# Patient Record
Sex: MALE | Race: BLACK OR AFRICAN AMERICAN | Employment: FULL TIME | URBAN - METROPOLITAN AREA
[De-identification: names, ages, dates, MRNs, and addresses within clinical notes are randomized per-mention and may not be internally consistent; named-entity substitution may affect disease eponyms.]

---

## 2017-04-03 ENCOUNTER — OFFICE VISIT (OUTPATIENT)
Dept: FAMILY MEDICINE CLINIC | Age: 27
End: 2017-04-03

## 2017-04-03 VITALS
HEART RATE: 70 BPM | OXYGEN SATURATION: 97 % | WEIGHT: 207.5 LBS | DIASTOLIC BLOOD PRESSURE: 80 MMHG | HEIGHT: 72 IN | SYSTOLIC BLOOD PRESSURE: 100 MMHG | BODY MASS INDEX: 28.1 KG/M2 | RESPIRATION RATE: 16 BRPM | TEMPERATURE: 98.3 F

## 2017-04-03 DIAGNOSIS — R06.6 HICCUPS: Primary | ICD-10-CM

## 2017-04-03 NOTE — PROGRESS NOTES
1. Have you been to the ER, urgent care clinic since your last visit? Hospitalized since your last visit? No    2. Have you seen or consulted any other health care providers outside of the [address] [place] [address] since your last visit? Include any pap smears or colon screening. No    Chief Complaint   Patient presents with    Hiccups     last week had hiccups every day all day and stopped over the weekend     Pt states he has hiccups last week every day, all day and stopper over the weekend.

## 2017-04-03 NOTE — MR AVS SNAPSHOT
Visit Information Date & Time Provider Department Dept. Phone Encounter #  
 4/3/2017  4:00 PM Princess Martino, [address] [phone] [id number] Upcoming Health Maintenance Date Due DTaP/Tdap/Td series (1 - Tdap) 5/14/2013 INFLUENZA AGE 9 TO ADULT 8/1/2016 Allergies as of 4/3/2017  Review Complete On: 4/3/2017 By: Scott Martin LPN Severity Noted Reaction Type Reactions Pcn [Penicillins]  03/01/2011    Hives Current Immunizations  Never Reviewed No immunizations on file. Not reviewed this visit You Were Diagnosed With   
  
 Codes Comments Hiccups    -  Primary ICD-10-CM: R06.6 ICD-9-CM: 302. 8 Vitals BP Pulse Temp Resp Height(growth percentile) Weight(growth percentile) 100/80 70 98.3 °F (36.8 °C) (Oral) 16 6' (1.829 m) 207 lb 8 oz (94.1 kg) SpO2 BMI Smoking Status 97% 28.14 kg/m2 Never Smoker Vitals History BMI and BSA Data Body Mass Index Body Surface Area  
 28.14 kg/m 2 2.19 m 2 Preferred Pharmacy Pharmacy Name Phone 99 [facility], [address] [place] [address] [phone] Your Updated Medication List  
  
   
This list is accurate as of: 4/3/17  4:56 PM.  Always use your most recent med list.  
  
  
  
  
 bacitracin 500 unit/gram Oint Commonly known as:  Van[address] Apply 1 Each to affected area two (2) times a day. mometasone 0.1 % topical cream  
Commonly known as:  Richard Revels Apply  to affected area two (2) times a day. As needed for skin irritation/rash Introducing [facility]! Sam Durbin introduces [employer] patient portal. Now you can access parts of your medical record, email your doctor's office, and request medication refills online. 1. In your internet browser, go to [email or web address]. [employer]. [employer] 2. Click on the First Time User? Click Here link in the Sign In box. You will see the New Member Sign Up page. 3. Enter your [employer] Access Code exactly as it appears below. You will not need to use this code after youve completed the sign-up process. If you do not sign up before the expiration date, you must request a new code. · [employer] Access Code: [email or web address] Expires: 7/2/2017  4:56 PM 
 
4. Enter the last four digits of your Social Security Number (xxxx) and Date of Birth (mm/dd/yyyy) as indicated and click Submit. You will be taken to the next sign-up page. 5. Create a [employer] ID. This will be your [employer] login ID and cannot be changed, so think of one that is secure and easy to remember. 6. Create a [employer] password. You can change your password at any time. 7. Enter your Password Reset Question and Answer. This can be used at a later time if you forget your password. 8. Enter your e-mail address. You will receive e-mail notification when new information is available in [address] 9. Click Sign Up. You can now view and download portions of your medical record. 10. Click the Download Summary menu link to download a portable copy of your medical information. If you have questions, please visit the Frequently Asked Questions section of the [employer] website. Remember, [employer] is NOT to be used for urgent needs. For medical emergencies, dial 911. Now available from your iPhone and Android! Please provide this summary of care documentation to your next provider. Your primary care clinician is listed as Emre Gonzalez. If you have any questions after today's visit, please call [phone].

## 2017-04-03 NOTE — PROGRESS NOTES
HISTORY OF PRESENT ILLNESS  Scarlet Stoddard is a 27 y.o. male. HPI  He is here for hiccups that he had on and off all of last week  Did not occur at night with sleep, only during the day  Does not have gerd or heart burn however does eat wiliam or mcdonalds every day   Does not cook for himself  No headaches, no other sx noted  Has not happened in 3 days    ROS  A comprehensive review of system was obtained and negative except findings in the HPI    Visit Vitals    /80    Pulse 70    Temp 98.3 °F (36.8 °C) (Oral)    Resp 16    Ht 6' (1.829 m)    Wt 207 lb 8 oz (94.1 kg)    SpO2 97%    BMI 28.14 kg/m2     Physical Exam   Constitutional: He is oriented to person, place, and time. He appears well-developed and well-nourished. No distress. Cardiovascular: Normal rate and regular rhythm. No murmur heard. Pulmonary/Chest: Breath sounds normal. No respiratory distress. He has no wheezes. Musculoskeletal: He exhibits no edema. Neurological: He is alert and oriented to person, place, and time. Nursing note and vitals reviewed. ASSESSMENT and PLAN  Encounter Diagnoses   Name Primary?  Hiccups Yes     No orders of the defined types were placed in this encounter. Reviewed triggers  Reviewed food choices; eating slower  Follow-up Disposition: Not on File    I have discussed the diagnosis with the patient and the intended plan as seen in the above orders. The patient has received an after-visit summary and questions were answered concerning future plans. Patient conveyed understanding of the plan at the time of the visit.     Ana Shaw, MSN, ANP  4/3/2017

## 2018-09-05 ENCOUNTER — HOSPITAL ENCOUNTER (EMERGENCY)
Age: 28
Discharge: HOME | End: 2018-09-05
Attending: EMERGENCY MEDICINE | Admitting: EMERGENCY MEDICINE
Payer: COMMERCIAL

## 2018-09-05 VITALS
BODY MASS INDEX: 29.8 KG/M2 | SYSTOLIC BLOOD PRESSURE: 125 MMHG | RESPIRATION RATE: 14 BRPM | HEIGHT: 72 IN | OXYGEN SATURATION: 97 % | WEIGHT: 220 LBS | TEMPERATURE: 98.3 F | HEART RATE: 85 BPM | DIASTOLIC BLOOD PRESSURE: 83 MMHG

## 2018-09-05 DIAGNOSIS — R11.2 NON-INTRACTABLE VOMITING WITH NAUSEA, UNSPECIFIED VOMITING TYPE: Primary | ICD-10-CM

## 2018-09-05 DIAGNOSIS — R10.84 ABDOMINAL PAIN, GENERALIZED: ICD-10-CM

## 2018-09-05 DIAGNOSIS — R19.7 DIARRHEA, UNSPECIFIED TYPE: ICD-10-CM

## 2018-09-05 LAB
ALBUMIN SERPL-MCNC: 3.8 G/DL (ref 3.5–5)
ALBUMIN/GLOB SERPL: 1 {RATIO} (ref 1.1–2.2)
ALP SERPL-CCNC: 72 U/L (ref 45–117)
ALT SERPL-CCNC: 45 U/L (ref 12–78)
ANION GAP SERPL CALC-SCNC: 9 MMOL/L (ref 5–15)
APPEARANCE UR: CLEAR
AST SERPL-CCNC: 28 U/L (ref 15–37)
BACTERIA URNS QL MICRO: NEGATIVE /HPF
BASOPHILS # BLD: 0 K/UL (ref 0–0.1)
BASOPHILS NFR BLD: 1 % (ref 0–1)
BILIRUB SERPL-MCNC: 0.8 MG/DL (ref 0.2–1)
BILIRUB UR QL: NEGATIVE
BUN SERPL-MCNC: 12 MG/DL (ref 6–20)
BUN/CREAT SERPL: 10 (ref 12–20)
CALCIUM SERPL-MCNC: 8.8 MG/DL (ref 8.5–10.1)
CHLORIDE SERPL-SCNC: 100 MMOL/L (ref 97–108)
CO2 SERPL-SCNC: 26 MMOL/L (ref 21–32)
COLOR UR: ABNORMAL
CREAT SERPL-MCNC: 1.19 MG/DL (ref 0.7–1.3)
DIFFERENTIAL METHOD BLD: ABNORMAL
EOSINOPHIL # BLD: 0 K/UL (ref 0–0.4)
EOSINOPHIL NFR BLD: 0 % (ref 0–7)
EPITH CASTS URNS QL MICRO: ABNORMAL /LPF
ERYTHROCYTE [DISTWIDTH] IN BLOOD BY AUTOMATED COUNT: 11.5 % (ref 11.5–14.5)
GLOBULIN SER CALC-MCNC: 3.9 G/DL (ref 2–4)
GLUCOSE SERPL-MCNC: 91 MG/DL (ref 65–100)
GLUCOSE UR STRIP.AUTO-MCNC: NEGATIVE MG/DL
HCT VFR BLD AUTO: 47.7 % (ref 36.6–50.3)
HGB BLD-MCNC: 16.3 G/DL (ref 12.1–17)
HGB UR QL STRIP: ABNORMAL
HYALINE CASTS URNS QL MICRO: ABNORMAL /LPF (ref 0–5)
IMM GRANULOCYTES # BLD: 0 K/UL (ref 0–0.04)
IMM GRANULOCYTES NFR BLD AUTO: 0 % (ref 0–0.5)
KETONES UR QL STRIP.AUTO: 40 MG/DL
LEUKOCYTE ESTERASE UR QL STRIP.AUTO: NEGATIVE
LIPASE SERPL-CCNC: 141 U/L (ref 73–393)
LYMPHOCYTES # BLD: 1.1 K/UL (ref 0.8–3.5)
LYMPHOCYTES NFR BLD: 31 % (ref 12–49)
MCH RBC QN AUTO: 28.5 PG (ref 26–34)
MCHC RBC AUTO-ENTMCNC: 34.2 G/DL (ref 30–36.5)
MCV RBC AUTO: 83.5 FL (ref 80–99)
MONOCYTES # BLD: 0.5 K/UL (ref 0–1)
MONOCYTES NFR BLD: 14 % (ref 5–13)
NEUTS SEG # BLD: 1.9 K/UL (ref 1.8–8)
NEUTS SEG NFR BLD: 55 % (ref 32–75)
NITRITE UR QL STRIP.AUTO: NEGATIVE
NRBC # BLD: 0 K/UL (ref 0–0.01)
NRBC BLD-RTO: 0 PER 100 WBC
PH UR STRIP: 6 [PH] (ref 5–8)
PLATELET # BLD AUTO: 244 K/UL (ref 150–400)
PMV BLD AUTO: 9.6 FL (ref 8.9–12.9)
POTASSIUM SERPL-SCNC: 3.7 MMOL/L (ref 3.5–5.1)
PROT SERPL-MCNC: 7.7 G/DL (ref 6.4–8.2)
PROT UR STRIP-MCNC: ABNORMAL MG/DL
RBC # BLD AUTO: 5.71 M/UL (ref 4.1–5.7)
RBC #/AREA URNS HPF: ABNORMAL /HPF (ref 0–5)
SODIUM SERPL-SCNC: 135 MMOL/L (ref 136–145)
SP GR UR REFRACTOMETRY: 1.03 (ref 1–1.03)
UROBILINOGEN UR QL STRIP.AUTO: 1 EU/DL (ref 0.2–1)
WBC # BLD AUTO: 3.5 K/UL (ref 4.1–11.1)
WBC URNS QL MICRO: ABNORMAL /HPF (ref 0–4)

## 2018-09-05 PROCEDURE — 74011250636 HC RX REV CODE- 250/636: Performed by: EMERGENCY MEDICINE

## 2018-09-05 PROCEDURE — 85025 COMPLETE CBC W/AUTO DIFF WBC: CPT | Performed by: EMERGENCY MEDICINE

## 2018-09-05 PROCEDURE — 36415 COLL VENOUS BLD VENIPUNCTURE: CPT | Performed by: EMERGENCY MEDICINE

## 2018-09-05 PROCEDURE — 83690 ASSAY OF LIPASE: CPT | Performed by: EMERGENCY MEDICINE

## 2018-09-05 PROCEDURE — 81001 URINALYSIS AUTO W/SCOPE: CPT | Performed by: EMERGENCY MEDICINE

## 2018-09-05 PROCEDURE — 99283 EMERGENCY DEPT VISIT LOW MDM: CPT

## 2018-09-05 PROCEDURE — 96361 HYDRATE IV INFUSION ADD-ON: CPT

## 2018-09-05 PROCEDURE — 80053 COMPREHEN METABOLIC PANEL: CPT | Performed by: EMERGENCY MEDICINE

## 2018-09-05 PROCEDURE — 96374 THER/PROPH/DIAG INJ IV PUSH: CPT

## 2018-09-05 RX ORDER — ONDANSETRON 2 MG/ML
8 INJECTION INTRAMUSCULAR; INTRAVENOUS
Status: COMPLETED | OUTPATIENT
Start: 2018-09-05 | End: 2018-09-05

## 2018-09-05 RX ORDER — DICYCLOMINE HYDROCHLORIDE 20 MG/1
20 TABLET ORAL
Qty: 20 TAB | Refills: 0 | Status: SHIPPED | OUTPATIENT
Start: 2018-09-05

## 2018-09-05 RX ORDER — ONDANSETRON 8 MG/1
8 TABLET, ORALLY DISINTEGRATING ORAL
Qty: 12 TAB | Refills: 0 | Status: SHIPPED | OUTPATIENT
Start: 2018-09-05 | End: 2018-09-12

## 2018-09-05 RX ADMIN — SODIUM CHLORIDE 1000 ML: 900 INJECTION, SOLUTION INTRAVENOUS at 06:08

## 2018-09-05 RX ADMIN — ONDANSETRON 8 MG: 2 INJECTION INTRAMUSCULAR; INTRAVENOUS at 05:52

## 2018-09-05 NOTE — ED NOTES
Verbal shift change report given to [address] (oncoming nurse) by Clint Elizondo (offgoing nurse). Report included the following information SBAR, ED Summary, MAR and Recent Results.

## 2018-09-05 NOTE — ED TRIAGE NOTES
Pt to triage ambulatory with steady gait. Pt A&O x 4. Pt reports vomiting x a few days and diarrhea. Pt also reports lower abdominal pain.

## 2018-09-05 NOTE — Clinical Note
- Zofran as needed for nausea/vomiting. 
- Bentyl as needed for abdominal pain. - Drink lots of fluids. 
- Return to ED for fever, increased pain, persistent vomiting, concern for dehydration, any other concerns.

## 2018-09-05 NOTE — ED PROVIDER NOTES
HPI Comments: The patient presents to the ED with n/v/d. Symptoms began 2 days ago on Monday. He notes numerous episodes >20 of non-bilious, non-bloody emesis. On Tuesday at 1 AM, he developed non-stop diarrhea. Diarrhea is non-bloody. He denies any fever. He notes mild intermittent abdominal pain. He denies any dysuria or hematuria. No recent travel. No well water. He denies any recent antibiotics. His girlfriend had diarrhea last week. No meds were take prior to arrival. He denies any other complaints. No hx abdominal surgery. Patient is a 34 y.o. male presenting with diarrhea and vomiting. The history is provided by the patient and a parent. Diarrhea    Associated symptoms include diarrhea, nausea and vomiting. Pertinent negatives include no fever, no dysuria, no headaches and no chest pain. Vomiting    Associated symptoms include abdominal pain and diarrhea. Pertinent negatives include no chills, no fever, no headaches, no cough and no headaches. Past Medical History:   Diagnosis Date    Sleep apnea        Past Surgical History:   Procedure Laterality Date    HX HEENT  2010    throat surgery         Family History:   Problem Relation Age of Onset    No Known Problems Mother     No Known Problems Father        Social History     Social History    Marital status: SINGLE     Spouse name: N/A    Number of children: N/A    Years of education: N/A     Occupational History    Not on file. Social History Main Topics    Smoking status: Never Smoker    Smokeless tobacco: Never Used    Alcohol use No    Drug use: No    Sexual activity: Yes     Partners: Female     Other Topics Concern    Not on file     Social History Narrative         ALLERGIES: Pcn [penicillins]    Review of Systems   Constitutional: Negative for appetite change, chills and fever. HENT: Negative for congestion, nosebleeds and sore throat. Eyes: Negative for pain and discharge.    Respiratory: Negative for cough and shortness of breath. Cardiovascular: Negative for chest pain. Gastrointestinal: Positive for abdominal pain, diarrhea, nausea and vomiting. Genitourinary: Negative for dysuria. Musculoskeletal: Negative. Skin: Negative for rash. Neurological: Negative for weakness and headaches. Hematological: Negative for adenopathy. Psychiatric/Behavioral: Negative. All other systems reviewed and are negative. Vitals:    09/05/18 0547 09/05/18 0607   BP: 122/74    Pulse:  86   Resp: 14    Temp: 98.3 °F (36.8 °C)    SpO2: 95%    Weight: 99.8 kg (220 lb)    Height: 6' (1.829 m)             Physical Exam   Constitutional: He is oriented to person, place, and time. He appears well-developed and well-nourished. HENT:   Head: Normocephalic and atraumatic. Mouth/Throat: Oropharynx is clear and moist.   Eyes: Conjunctivae are normal.   Neck: Normal range of motion. Neck supple. Cardiovascular: Normal rate, regular rhythm and normal heart sounds. Pulmonary/Chest: Effort normal and breath sounds normal.   Abdominal: Soft. Bowel sounds are normal. He exhibits no distension. There is no tenderness. Musculoskeletal: Normal range of motion. He exhibits no edema or tenderness. Neurological: He is alert and oriented to person, place, and time. Skin: Skin is warm and dry. Psychiatric: He has a normal mood and affect. His behavior is normal.   Nursing note and vitals reviewed. [facility]      ED Course       Procedures    A/P:  1. N/V/D - suspect viral gastroenteritis. Plan Zofran and IVF.    7:03 AM  Change of shift. Care of patient signed over to Dr. Ari Giang. Bedside handoff complete.

## 2018-09-05 NOTE — DISCHARGE INSTRUCTIONS
Abdominal Pain: Care Instructions  Your Care Instructions    Abdominal pain has many possible causes. Some aren't serious and get better on their own in a few days. Others need more testing and treatment. If your pain continues or gets worse, you need to be rechecked and may need more tests to find out what is wrong. You may need surgery to correct the problem. Don't ignore new symptoms, such as fever, nausea and vomiting, urination problems, pain that gets worse, and dizziness. These may be signs of a more serious problem. Your doctor may have recommended a follow-up visit in the next 8 to 12 hours. If you are not getting better, you may need more tests or treatment. The doctor has checked you carefully, but problems can develop later. If you notice any problems or new symptoms, get medical treatment right away. Follow-up care is a key part of your treatment and safety. Be sure to make and go to all appointments, and call your doctor if you are having problems. It's also a good idea to know your test results and keep a list of the medicines you take. How can you care for yourself at home? · Rest until you feel better. · To prevent dehydration, drink plenty of fluids, enough so that your urine is light yellow or clear like water. Choose water and other caffeine-free clear liquids until you feel better. If you have kidney, heart, or liver disease and have to limit fluids, talk with your doctor before you increase the amount of fluids you drink. · If your stomach is upset, eat mild foods, such as rice, dry toast or crackers, bananas, and applesauce. Try eating several small meals instead of two or three large ones. · Wait until 48 hours after all symptoms have gone away before you have spicy foods, alcohol, and drinks that contain caffeine. · Do not eat foods that are high in fat. · Avoid anti-inflammatory medicines such as aspirin, ibuprofen (Advil, Motrin), and naproxen (Aleve).  These can cause stomach upset. Talk to your doctor if you take daily aspirin for another health problem. When should you call for help? Call 911 anytime you think you may need emergency care. For example, call if:    · You passed out (lost consciousness).     · You pass maroon or very bloody stools.     · You vomit blood or what looks like coffee grounds.     · You have new, severe belly pain.    Call your doctor now or seek immediate medical care if:    · Your pain gets worse, especially if it becomes focused in one area of your belly.     · You have a new or higher fever.     · Your stools are black and look like tar, or they have streaks of blood.     · You have unexpected vaginal bleeding.     · You have symptoms of a urinary tract infection. These may include:  ¨ Pain when you urinate. ¨ Urinating more often than usual.  ¨ Blood in your urine.     · You are dizzy or lightheaded, or you feel like you may faint.    Watch closely for changes in your health, and be sure to contact your doctor if:    · You are not getting better after 1 day (24 hours). Where can you learn more? Go to http://asiaHyporistephanie.info/. Enter F742 in the search box to learn more about \"Abdominal Pain: Care Instructions. \"  Current as of: November 20, 2017  Content Version: 11.7  © 9399-2572 [employer]. Care instructions adapted under license by [employer] (which disclaims liability or warranty for this information). If you have questions about a medical condition or this instruction, always ask your healthcare professional. [place] [address] any warranty or liability for your use of this information. Diarrhea: Care Instructions  Your Care Instructions    Diarrhea is loose, watery stools (bowel movements). The exact cause is often hard to find. Sometimes diarrhea is your body's way of getting rid of what caused an upset stomach.  Viruses, food poisoning, and many medicines can cause diarrhea. Some people get diarrhea in response to emotional stress, anxiety, or certain foods. Almost everyone has diarrhea now and then. It usually isn't serious, and your stools will return to normal soon. The important thing to do is replace the fluids you have lost, so you can prevent dehydration. The doctor has checked you carefully, but problems can develop later. If you notice any problems or new symptoms, get medical treatment right away. Follow-up care is a key part of your treatment and safety. Be sure to make and go to all appointments, and call your doctor if you are having problems. It's also a good idea to know your test results and keep a list of the medicines you take. How can you care for yourself at home? · Watch for signs of dehydration, which means your body has lost too much water. Dehydration is a serious condition and should be treated right away. Signs of dehydration are:  ¨ Increasing thirst and dry eyes and mouth. ¨ Feeling faint or lightheaded. ¨ Darker urine, and a smaller amount of urine than normal.  · To prevent dehydration, drink plenty of fluids, enough so that your urine is light yellow or clear like water. Choose water and other caffeine-free clear liquids until you feel better. If you have kidney, heart, or liver disease and have to limit fluids, talk with your doctor before you increase the amount of fluids you drink. · Begin eating small amounts of mild foods the next day, if you feel like it. ¨ Try yogurt that has live cultures of Lactobacillus. (Check the label.)  ¨ Avoid spicy foods, fruits, alcohol, and caffeine until 48 hours after all symptoms are gone. ¨ Avoid chewing gum that contains sorbitol. ¨ Avoid dairy products (except for yogurt with Lactobacillus) while you have diarrhea and for 3 days after symptoms are gone. · The doctor may recommend that you take over-the-counter medicine, such as loperamide (Imodium), if you still have diarrhea after 6 hours. Read and follow all instructions on the label. Do not use this medicine if you have bloody diarrhea, a high fever, or other signs of serious illness. Call your doctor if you think you are having a problem with your medicine. When should you call for help? Call 911 anytime you think you may need emergency care. For example, call if:    · You passed out (lost consciousness).     · Your stools are maroon or very bloody.    Call your doctor now or seek immediate medical care if:    · You are dizzy or lightheaded, or you feel like you may faint.     · Your stools are black and look like tar, or they have streaks of blood.     · You have new or worse belly pain.     · You have symptoms of dehydration, such as:  ¨ Dry eyes and a dry mouth. ¨ Passing only a little dark urine. ¨ Feeling thirstier than usual.     · You have a new or higher fever.    Watch closely for changes in your health, and be sure to contact your doctor if:    · Your diarrhea is getting worse.     · You see pus in the diarrhea.     · You are not getting better after 2 days (48 hours). Where can you learn more? Go to http://asia-stephaine.info/. Enter G599 in the search box to learn more about \"Diarrhea: Care Instructions. \"  Current as of: November 20, 2017  Content Version: 11.7  © 2077-9768 [employer]. Care instructions adapted under license by [employer] (which disclaims liability or warranty for this information). If you have questions about a medical condition or this instruction, always ask your healthcare professional. [place] [address] any warranty or liability for your use of this information. Nausea and Vomiting: Care Instructions  Your Care Instructions    When you are nauseated, you may feel weak and sweaty and notice a lot of saliva in your mouth. Nausea often leads to vomiting.  Most of the time you do not need to worry about nausea and vomiting, but they can be signs of other illnesses. Two common causes of nausea and vomiting are stomach flu and food poisoning. Nausea and vomiting from viral stomach flu will usually start to improve within 24 hours. Nausea and vomiting from food poisoning may last from 12 to 48 hours. The doctor has checked you carefully, but problems can develop later. If you notice any problems or new symptoms, get medical treatment right away. Follow-up care is a key part of your treatment and safety. Be sure to make and go to all appointments, and call your doctor if you are having problems. It's also a good idea to know your test results and keep a list of the medicines you take. How can you care for yourself at home? · To prevent dehydration, drink plenty of fluids, enough so that your urine is light yellow or clear like water. Choose water and other caffeine-free clear liquids until you feel better. If you have kidney, heart, or liver disease and have to limit fluids, talk with your doctor before you increase the amount of fluids you drink. · Rest in bed until you feel better. · When you are able to eat, try clear soups, mild foods, and liquids until all symptoms are gone for 12 to 48 hours. Other good choices include dry toast, crackers, cooked cereal, and gelatin dessert, such as Jell-O. When should you call for help? Call 911 anytime you think you may need emergency care. For example, call if:    · You passed out (lost consciousness).    Call your doctor now or seek immediate medical care if:    · You have symptoms of dehydration, such as:  ¨ Dry eyes and a dry mouth. ¨ Passing only a little dark urine.   ¨ Feeling thirstier than usual.     · You have new or worsening belly pain.     · You have a new or higher fever.     · You vomit blood or what looks like coffee grounds.    Watch closely for changes in your health, and be sure to contact your doctor if:    · You have ongoing nausea and vomiting.     · Your vomiting is getting worse.     · Your vomiting lasts longer than 2 days.     · You are not getting better as expected. Where can you learn more? Go to http://asia-stephanie.info/. Enter 25 233771 in the search box to learn more about \"Nausea and Vomiting: Care Instructions. \"  Current as of: November 20, 2017  Content Version: 11.7  © 3185-6785 [employer]. Care instructions adapted under license by [employer] (which disclaims liability or warranty for this information). If you have questions about a medical condition or this instruction, always ask your healthcare professional. [place] [address] any warranty or liability for your use of this information.

## 2018-09-05 NOTE — LETTER
[address] 
[facility] EMERGENCY DEPT 
[address] [facility] [address] [id number] 
[phone] Work/School Note Date: 9/5/2018 To Whom It May concern: 
 
Michelle Singh was seen and treated today in the emergency room by the following provider(s): 
Attending Provider: Burgess Homer MD.   
 
Michelle Singh may return to work on 9/6/18.  
 
Sincerely, 
 
 
 
 
Burgess Homer MD

## 2018-09-10 ENCOUNTER — OFFICE VISIT (OUTPATIENT)
Dept: FAMILY MEDICINE CLINIC | Age: 28
End: 2018-09-10

## 2018-09-10 ENCOUNTER — DOCUMENTATION ONLY (OUTPATIENT)
Dept: FAMILY MEDICINE CLINIC | Age: 28
End: 2018-09-10

## 2018-09-10 VITALS
TEMPERATURE: 98.3 F | HEIGHT: 72 IN | RESPIRATION RATE: 18 BRPM | DIASTOLIC BLOOD PRESSURE: 75 MMHG | BODY MASS INDEX: 28.31 KG/M2 | WEIGHT: 209 LBS | OXYGEN SATURATION: 100 % | SYSTOLIC BLOOD PRESSURE: 115 MMHG | HEART RATE: 60 BPM

## 2018-09-10 DIAGNOSIS — A08.4 STOMACH FLU: Primary | ICD-10-CM

## 2018-09-10 NOTE — PROGRESS NOTES
Chief Complaint   Patient presents with    Form Completion     Patient presents during walk in clinic for completion of fmla paperwork. Pt states he needs paperwork completed due to stomach virus that lasted for 3 days. Pt states he was seen in urgent care and Ed for virus. 1. Have you been to the ER, urgent care clinic since your last visit? Hospitalized since your last visit? Yes see note    2. Have you seen or consulted any other health care providers outside of the [address] [place] [address] since your last visit? Include any pap smears or colon screening.  No

## 2018-09-10 NOTE — PROGRESS NOTES
Faxed FMLA form to [employer] @ [phone]. Confirmation number [id number]. Original form placed in scan folder for central scanning.

## 2018-09-10 NOTE — PROGRESS NOTES
HISTORY OF PRESENT ILLNESS  Lauro Umaña is a 34 y.o. male. HPI  Patient presents during walk in clinic for completion of fmla paperwork. Pt states he needs paperwork completed due to stomach virus that lasted for 3 days. Pt states he was seen in urgent care and Ed for virus. All sx have since resolved, [employer] just needs forms completed for his days missed    ROS  A comprehensive review of system was obtained and negative except findings in the HPI    Visit Vitals    /75 (BP 1 Location: Right arm, BP Patient Position: Sitting)    Pulse 60    Temp 98.3 °F (36.8 °C) (Oral)    Resp 18    Ht 6' (1.829 m)    Wt 209 lb (94.8 kg)    SpO2 100%    BMI 28.35 kg/m2     Physical Exam   Constitutional: He is oriented to person, place, and time. He appears well-developed and well-nourished. No distress. Cardiovascular: Normal rate and regular rhythm. No murmur heard. Pulmonary/Chest: Breath sounds normal. No respiratory distress. He has no wheezes. Musculoskeletal: He exhibits no edema. Neurological: He is alert and oriented to person, place, and time. Nursing note and vitals reviewed. ASSESSMENT and PLAN  Encounter Diagnoses   Name Primary?  Stomach flu Yes     No orders of the defined types were placed in this encounter. FMLA papers completed for 9/4 and 9/5, returning 9/6  Reviewed documentation from [facility] and [facility] to complete form  Faxed and scanned for chart    I have discussed the diagnosis with the patient and the intended plan as seen in the above orders. The patient has received an after-visit summary and questions were answered concerning future plans. Patient conveyed understanding of the plan at the time of the visit.     Jae Fuentes, MSN, ANP  9/10/2018

## 2018-09-10 NOTE — MR AVS SNAPSHOT
[address] [address] 
[phone] Patient: Theadora Oppenheim MRN:  [id number] Elodia Lazar Visit Information Date & Time Provider Department Dept. Phone Encounter #  
 9/10/2018  7:30 AM Lopez Dai NP [facility] [phone] [id number] Upcoming Health Maintenance Date Due DTaP/Tdap/Td series (1 - Tdap) 5/14/2013 Influenza Age 5 to Adult 8/1/2018 Allergies as of 9/10/2018  Review Complete On: 9/10/2018 By: Lopez Dai NP Severity Noted Reaction Type Reactions Pcn [Penicillins]  03/01/2011    Hives Current Immunizations  Never Reviewed No immunizations on file. Not reviewed this visit You Were Diagnosed With   
  
 Codes Comments Stomach flu    -  Primary ICD-10-CM: A08.4 ICD-9-CM: 487. 8 Vitals BP Pulse Temp Resp Height(growth percentile) Weight(growth percentile) 115/75 (BP 1 Location: Right arm, BP Patient Position: Sitting) 60 98.3 °F (36.8 °C) (Oral) 18 6' (1.829 m) 209 lb (94.8 kg) SpO2 BMI Smoking Status 100% 28.35 kg/m2 Never Smoker BMI and BSA Data Body Mass Index Body Surface Area  
 28.35 kg/m 2 2.19 m 2 Preferred Pharmacy Pharmacy Name Phone [facility], [address] [place] [address] [phone] Your Updated Medication List  
  
   
This list is accurate as of 9/10/18  7:42 AM.  Always use your most recent med list.  
  
  
  
  
 bacitracin 500 unit/gram Oint Commonly known as:  [address] Apply 1 Each to affected area two (2) times a day. dicyclomine 20 mg tablet Commonly known as:  BENTYL Take 1 Tab by mouth every six (6) hours as needed (abdominal cramps) for up to 20 doses. mometasone 0.1 % topical cream  
Commonly known as:  Etha Im Apply  to affected area two (2) times a day.  As needed for skin irritation/rash  
  
 ondansetron 8 mg disintegrating tablet Commonly known as:  ZOFRAN ODT Take 1 Tab by mouth every eight (8) hours as needed for Nausea for up to 7 days. Introducing [facility] SERVICES! Stefanie Nagy introduces [employer] patient portal. Now you can access parts of your medical record, email your doctor's office, and request medication refills online. 1. In your internet browser, go to [email or web address]. [email or web address] 2. Click on the First Time User? Click Here link in the Sign In box. You will see the New Member Sign Up page. 3. Enter your [employer] Access Code exactly as it appears below. You will not need to use this code after youve completed the sign-up process. If you do not sign up before the expiration date, you must request a new code. · [employer] Access Code: [email or web address] Expires: 12/4/2018  5:45 AM 
 
4. Enter the last four digits of your Social Security Number (xxxx) and Date of Birth (mm/dd/yyyy) as indicated and click Submit. You will be taken to the next sign-up page. 5. Create a [employer] ID. This will be your [employer] login ID and cannot be changed, so think of one that is secure and easy to remember. 6. Create a [employer] password. You can change your password at any time. 7. Enter your Password Reset Question and Answer. This can be used at a later time if you forget your password. 8. Enter your e-mail address. You will receive e-mail notification when new information is available in [address] 9. Click Sign Up. You can now view and download portions of your medical record. 10. Click the Download Summary menu link to download a portable copy of your medical information. If you have questions, please visit the Frequently Asked Questions section of the [employer] website. Remember, [employer] is NOT to be used for urgent needs. For medical emergencies, dial 911. Now available from your iPhone and Android! Please provide this summary of care documentation to your next provider. Your primary care clinician is listed as Trav Arreola. If you have any questions after today's visit, please call [phone].

## 2018-10-29 ENCOUNTER — OFFICE VISIT (OUTPATIENT)
Dept: FAMILY MEDICINE CLINIC | Age: 28
End: 2018-10-29

## 2018-10-29 VITALS
DIASTOLIC BLOOD PRESSURE: 76 MMHG | OXYGEN SATURATION: 96 % | BODY MASS INDEX: 29.15 KG/M2 | TEMPERATURE: 98.3 F | HEART RATE: 71 BPM | RESPIRATION RATE: 18 BRPM | WEIGHT: 215.2 LBS | HEIGHT: 72 IN | SYSTOLIC BLOOD PRESSURE: 117 MMHG

## 2018-10-29 DIAGNOSIS — G47.39 OTHER SLEEP APNEA: ICD-10-CM

## 2018-10-29 DIAGNOSIS — R80.9 PROTEINURIA, UNSPECIFIED TYPE: ICD-10-CM

## 2018-10-29 DIAGNOSIS — Z00.00 WELL ADULT EXAM: Primary | ICD-10-CM

## 2018-10-29 LAB
BILIRUB UR QL STRIP: NEGATIVE
GLUCOSE UR-MCNC: NEGATIVE MG/DL
KETONES P FAST UR STRIP-MCNC: NEGATIVE MG/DL
PH UR STRIP: 7 [PH] (ref 4.6–8)
PROT UR QL STRIP: NORMAL
SP GR UR STRIP: 1.02 (ref 1–1.03)
UA UROBILINOGEN AMB POC: NORMAL (ref 0.2–1)
URINALYSIS CLARITY POC: CLEAR
URINALYSIS COLOR POC: NORMAL
URINE BLOOD POC: NEGATIVE
URINE LEUKOCYTES POC: NEGATIVE
URINE NITRITES POC: NEGATIVE

## 2018-10-29 RX ORDER — LEVOCETIRIZINE DIHYDROCHLORIDE 5 MG/1
5 TABLET, FILM COATED ORAL DAILY
Qty: 30 TAB | Refills: 5 | Status: SHIPPED | OUTPATIENT
Start: 2018-10-29

## 2018-10-29 NOTE — PROGRESS NOTES
Chief Complaint   Patient presents with    Complete Physical    Cough     pts mother states son has a persistent cough and bouts of diarrhea     1. Have you been to the ER, urgent care clinic since your last visit? Hospitalized since your last visit? No    2. Have you seen or consulted any other health care providers outside of the [address] [place] [address] since your last visit? Include any pap smears or colon screening.  No    Visit Vitals  /76 (BP 1 Location: Left arm, BP Patient Position: Sitting)   Pulse 71   Temp 98.3 °F (36.8 °C) (Oral)   Resp 18   Ht 6' (1.829 m)   Wt 215 lb 3.2 oz (97.6 kg)   SpO2 96%   BMI 29.19 kg/m²

## 2018-10-29 NOTE — LETTER
11/4/2018 8:29 AM 
 
Mr. Jerilee Litten [address] [facility] [address] Dear Jerilee Litten: 
 
Please find your most recent results below. Resulted Orders LIPID PANEL Result Value Ref Range Cholesterol, total 182 100 - 199 mg/dL Triglyceride 88 0 - 149 mg/dL HDL Cholesterol 46 >39 mg/dL LDL, calculated 118 (H) 0 - 99 mg/dL CBC WITH AUTOMATED DIFF Result Value Ref Range WBC 3.8 3.4 - 10.8 x10E3/uL  
 RBC 5.61 4.14 - 5.80 x10E6/uL HGB 16.1 13.0 - 17.7 g/dL HCT 46.4 37.5 - 51.0 % MCV 83 79 - 97 fL  
 MCH 28.7 26.6 - 33.0 pg  
 MCHC 34.7 31.5 - 35.7 g/dL  
 RDW 13.5 12.3 - 15.4 % PLATELET 229 712 - 561 x10E3/uL TSH 3RD GENERATION Result Value Ref Range TSH 0.502 0.450 - 4.500 uIU/mL METABOLIC PANEL, COMPREHENSIVE Result Value Ref Range Glucose 98 65 - 99 mg/dL BUN 10 6 - 20 mg/dL Creatinine 1.15 0.76 - 1.27 mg/dL GFR est non-AA 87 >59 mL/min/1.73 GFR est  >59 mL/min/1.73  
 BUN/Creatinine ratio 9 9 - 20 Sodium 143 134 - 144 mmol/L Potassium 4.5 3.5 - 5.2 mmol/L Chloride 101 96 - 106 mmol/L  
 CO2 29 20 - 29 mmol/L Calcium 9.8 8.7 - 10.2 mg/dL Protein, total 7.5 6.0 - 8.5 g/dL Albumin 4.7 3.5 - 5.5 g/dL GLOBULIN, TOTAL 2.8 1.5 - 4.5 g/dL A-G Ratio 1.7 1.2 - 2.2 Bilirubin, total 0.6 0.0 - 1.2 mg/dL Alk. phosphatase 72 39 - 117 IU/L  
 AST (SGOT) 20 0 - 40 IU/L  
 ALT (SGPT) 34 0 - 44 IU/L  
 
 
RECOMMENDATIONS: 
None. Keep up the good work! Work on diet and exercise. Recheck this test: 12 months. Please call me if you have any questions: [phone] Sincerely, 
 
 
La Guillermo, NP

## 2018-10-29 NOTE — PROGRESS NOTES
Subjective:   Pita Pan is a 34 y.o. male presenting for his annual checkup. ROS:  Feeling well. No dyspnea or chest pain on exertion. No abdominal pain, change in bowel habits, black or bloody stools. No urinary tract or prostatic symptoms. No neurological complaints. Specific concerns today: he is having persistent dry cough with drainage; has sleep apnea but not using it due to old equipment that doesn't fit. Girlfriend is in [address] [place] [address] and found polycystic kidney on practice imaging, mom states he has long pmh of proteinuria. There are no active problems to display for this patient. Current Outpatient Medications   Medication Sig Dispense Refill    levocetirizine (XYZAL) 5 mg tablet Take 1 Tab by mouth daily. 30 Tab 5    dicyclomine (BENTYL) 20 mg tablet Take 1 Tab by mouth every six (6) hours as needed (abdominal cramps) for up to 20 doses. (Patient not taking: Reported on 10/29/2018) 20 Tab 0    mometasone (ELOCON) 0.1 % topical cream Apply  to affected area two (2) times a day. As needed for skin irritation/rash (Patient not taking: Reported on 10/29/2018) 30 g 2    bacitracin (FIRST AID BACITRACIN) 500 unit/g Oint Apply 1 Each to affected area two (2) times a day. (Patient not taking: Reported on 10/29/2018) 1 Tube 1     Family History   Problem Relation Age of Onset    No Known Problems Mother     No Known Problems Father      Social History     Tobacco Use    Smoking status: Never Smoker    Smokeless tobacco: Never Used   Substance Use Topics    Alcohol use: No     Alcohol/week: 0.0 oz             Objective:     Visit Vitals  /76 (BP 1 Location: Left arm, BP Patient Position: Sitting)   Pulse 71   Temp 98.3 °F (36.8 °C) (Oral)   Resp 18   Ht 6' (1.829 m)   Wt 215 lb 3.2 oz (97.6 kg)   SpO2 96%   BMI 29.19 kg/m²     The patient appears well, alert, oriented x 3, in no distress. ENT normal.  Neck supple. No adenopathy or thyromegaly. VICTORIA.  Lungs are clear, good air entry, no wheezes, rhonchi or rales. S1 and S2 normal, no murmurs, regular rate and rhythm. Abdomen is soft without tenderness, guarding, mass or organomegaly.  exam: deferred. Extremities show no edema, normal peripheral pulses. Neurological is normal without focal findings. Assessment/Plan:   healthy adult male  lose weight, increase physical activity, follow low fat diet, follow low salt diet, referral given to [facility] Ordered for eval of proteinuria; start Xyzal for allergies. Encounter Diagnoses   Name Primary?  Well adult exam Yes    Other sleep apnea     Proteinuria, unspecified type      Orders Placed This Encounter    US RETROPERITONEUM COMP    LIPID PANEL    CBC WITH AUTOMATED DIFF    TSH 3RD GENERATION    METABOLIC PANEL, COMPREHENSIVE    REFERRAL TO SLEEP STUDIES    AMB POC URINALYSIS DIP STICK AUTO W/O MICRO    levocetirizine (XYZAL) 5 mg tablet   . I have discussed the diagnosis with the patient and the intended plan as seen in the above orders. The patient has received an after-visit summary and questions were answered concerning future plans. Patient conveyed understanding of the plan at the time of the visit.     Edgardo Pierce, MSN, ANP  10/29/2018

## 2018-10-30 LAB
ALBUMIN SERPL-MCNC: 4.7 G/DL (ref 3.5–5.5)
ALBUMIN/GLOB SERPL: 1.7 {RATIO} (ref 1.2–2.2)
ALP SERPL-CCNC: 72 IU/L (ref 39–117)
ALT SERPL-CCNC: 34 IU/L (ref 0–44)
AST SERPL-CCNC: 20 IU/L (ref 0–40)
BASOPHILS # BLD AUTO: 0 X10E3/UL (ref 0–0.2)
BASOPHILS NFR BLD AUTO: 1 %
BILIRUB SERPL-MCNC: 0.6 MG/DL (ref 0–1.2)
BUN SERPL-MCNC: 10 MG/DL (ref 6–20)
BUN/CREAT SERPL: 9 (ref 9–20)
CALCIUM SERPL-MCNC: 9.8 MG/DL (ref 8.7–10.2)
CHLORIDE SERPL-SCNC: 101 MMOL/L (ref 96–106)
CHOLEST SERPL-MCNC: 182 MG/DL (ref 100–199)
CO2 SERPL-SCNC: 29 MMOL/L (ref 20–29)
CREAT SERPL-MCNC: 1.15 MG/DL (ref 0.76–1.27)
EOSINOPHIL # BLD AUTO: 0.1 X10E3/UL (ref 0–0.4)
EOSINOPHIL NFR BLD AUTO: 2 %
ERYTHROCYTE [DISTWIDTH] IN BLOOD BY AUTOMATED COUNT: 13.5 % (ref 12.3–15.4)
GLOBULIN SER CALC-MCNC: 2.8 G/DL (ref 1.5–4.5)
GLUCOSE SERPL-MCNC: 98 MG/DL (ref 65–99)
HCT VFR BLD AUTO: 46.4 % (ref 37.5–51)
HDLC SERPL-MCNC: 46 MG/DL
HGB BLD-MCNC: 16.1 G/DL (ref 13–17.7)
IMM GRANULOCYTES # BLD: 0 X10E3/UL (ref 0–0.1)
IMM GRANULOCYTES NFR BLD: 0 %
INTERPRETATION, 910389: NORMAL
LDLC SERPL CALC-MCNC: 118 MG/DL (ref 0–99)
LYMPHOCYTES # BLD AUTO: 1.8 X10E3/UL (ref 0.7–3.1)
LYMPHOCYTES NFR BLD AUTO: 48 %
MCH RBC QN AUTO: 28.7 PG (ref 26.6–33)
MCHC RBC AUTO-ENTMCNC: 34.7 G/DL (ref 31.5–35.7)
MCV RBC AUTO: 83 FL (ref 79–97)
MONOCYTES # BLD AUTO: 0.2 X10E3/UL (ref 0.1–0.9)
MONOCYTES NFR BLD AUTO: 6 %
NEUTROPHILS # BLD AUTO: 1.6 X10E3/UL (ref 1.4–7)
NEUTROPHILS NFR BLD AUTO: 43 %
PLATELET # BLD AUTO: 288 X10E3/UL (ref 150–379)
POTASSIUM SERPL-SCNC: 4.5 MMOL/L (ref 3.5–5.2)
PROT SERPL-MCNC: 7.5 G/DL (ref 6–8.5)
RBC # BLD AUTO: 5.61 X10E6/UL (ref 4.14–5.8)
SODIUM SERPL-SCNC: 143 MMOL/L (ref 134–144)
TRIGL SERPL-MCNC: 88 MG/DL (ref 0–149)
TSH SERPL DL<=0.005 MIU/L-ACNC: 0.5 UIU/ML (ref 0.45–4.5)
VLDLC SERPL CALC-MCNC: 18 MG/DL (ref 5–40)
WBC # BLD AUTO: 3.8 X10E3/UL (ref 3.4–10.8)

## 2019-01-21 ENCOUNTER — HOSPITAL ENCOUNTER (OUTPATIENT)
Dept: ULTRASOUND IMAGING | Age: 29
Discharge: HOME | End: 2019-01-21
Attending: NURSE PRACTITIONER
Payer: COMMERCIAL

## 2019-01-21 DIAGNOSIS — R80.9 PROTEINURIA, UNSPECIFIED TYPE: ICD-10-CM

## 2019-01-21 PROCEDURE — 76770 US EXAM ABDO BACK WALL COMP: CPT

## 2019-01-21 NOTE — PROGRESS NOTES
Hey there, you do have an enlarged kidney and potentially cystic changes. You need to see a Urologist for further work up. I usually send patients. To [facility] for eval, all MDs in the practice are great, #[phone].   Alejandra Roy

## 2019-01-28 ENCOUNTER — TELEPHONE (OUTPATIENT)
Dept: FAMILY MEDICINE CLINIC | Age: 29
End: 2019-01-28

## 2019-01-28 NOTE — TELEPHONE ENCOUNTER
Pt's mother called upset. She stated pt had a renal image done last week and never heard back with the results. PSR told mother that pt was signed up for [facility] and that the results were there. Mom was not happy with that answer and would like someone to call her at [phone] with the results.

## 2019-01-28 NOTE — TELEPHONE ENCOUNTER
Spoke with pt's mother informed her of labs results and to call the urologist she stated that she saw that and was still confused.  Advised her to go ahead and call them to make an appt to get further information pt said ok

## 2020-02-19 ENCOUNTER — OFFICE VISIT (OUTPATIENT)
Dept: FAMILY MEDICINE CLINIC | Age: 30
End: 2020-02-19

## 2020-02-19 VITALS
HEART RATE: 92 BPM | TEMPERATURE: 98.2 F | SYSTOLIC BLOOD PRESSURE: 124 MMHG | HEIGHT: 72 IN | RESPIRATION RATE: 18 BRPM | OXYGEN SATURATION: 96 % | WEIGHT: 214 LBS | BODY MASS INDEX: 28.99 KG/M2 | DIASTOLIC BLOOD PRESSURE: 80 MMHG

## 2020-02-19 DIAGNOSIS — A08.4 VIRAL GASTROENTERITIS: Primary | ICD-10-CM

## 2020-02-19 RX ORDER — ONDANSETRON 4 MG/1
4 TABLET, ORALLY DISINTEGRATING ORAL
Qty: 20 TAB | Refills: 0 | Status: SHIPPED | OUTPATIENT
Start: 2020-02-19

## 2020-02-19 NOTE — PATIENT INSTRUCTIONS
Gastroenteritis: Care Instructions  Your Care Instructions    Gastroenteritis is an illness that may cause nausea, vomiting, and diarrhea. It is sometimes called \"stomach flu. \" It can be caused by bacteria or a virus. You will probably begin to feel better in 1 to 2 days. In the meantime, get plenty of rest and make sure you do not become dehydrated. Dehydration occurs when your body loses too much fluid. Follow-up care is a key part of your treatment and safety. Be sure to make and go to all appointments, and call your doctor if you are having problems. It's also a good idea to know your test results and keep a list of the medicines you take. How can you care for yourself at home? · If your doctor prescribed antibiotics, take them as directed. Do not stop taking them just because you feel better. You need to take the full course of antibiotics. · Drink plenty of fluids to prevent dehydration, enough so that your urine is light yellow or clear like water. Choose water and other caffeine-free clear liquids until you feel better. If you have kidney, heart, or liver disease and have to limit fluids, talk with your doctor before you increase your fluid intake. · Drink fluids slowly, in frequent, small amounts, because drinking too much too fast can cause vomiting. · Begin eating mild foods, such as dry toast, yogurt, applesauce, bananas, and rice. Avoid spicy, hot, or high-fat foods, and do not drink alcohol or caffeine for a day or two. Do not drink milk or eat ice cream until you are feeling better. How to prevent gastroenteritis  · Keep hot foods hot and cold foods cold. · Do not eat meats, dressings, salads, or other foods that have been kept at room temperature for more than 2 hours. · Use a thermometer to check your refrigerator. It should be between 34°F and 40°F.  · Defrost meats in the refrigerator or microwave, not on the kitchen counter. · Keep your hands and your kitchen clean.  Wash your hands, cutting boards, and countertops with hot soapy water frequently. · Cook meat until it is well done. · Do not eat raw eggs or uncooked sauces made with raw eggs. · Do not take chances. If food looks or tastes spoiled, throw it out. When should you call for help? Call 911 anytime you think you may need emergency care. For example, call if:    · You vomit blood or what looks like coffee grounds.     · You passed out (lost consciousness).     · You pass maroon or very bloody stools.    Call your doctor now or seek immediate medical care if:    · You have severe belly pain.     · You have signs of needing more fluids. You have sunken eyes, a dry mouth, and pass only a little dark urine.     · You feel like you are going to faint.     · You have increased belly pain that does not go away in 1 to 2 days.     · You have new or increased nausea, or you are vomiting.     · You have a new or higher fever.     · Your stools are black and tarlike or have streaks of blood.    Watch closely for changes in your health, and be sure to contact your doctor if:    · You are dizzy or lightheaded.     · You urinate less than usual, or your urine is dark yellow or brown.     · You do not feel better with each day that goes by. Where can you learn more? Go to http://asia-stephanie.info/. Enter N142 in the search box to learn more about \"Gastroenteritis: Care Instructions. \"  Current as of: June 9, 2019  Content Version: 12.2  © 9653-4743 "[employer]. Care instructions adapted under license by [employer] (which disclaims liability or warranty for this information). If you have questions about a medical condition or this instruction, always ask your healthcare professional. [address] any warranty or liability for your use of this information.

## 2020-02-19 NOTE — LETTER
NOTIFICATION RETURN TO WORK / SCHOOL 
 
2/19/2020 3:15 PM 
 
Mr. Katy Darden [address] [facility] [phone] To Whom It May Concern: 
 
Katy Darden is currently under the care of [address]. He will return to work on: 2/20/20 If there are questions or concerns please have the patient contact our office. Sincerely, [address] [facility], DO

## 2020-02-19 NOTE — PROGRESS NOTES
Cornelio Harper is a 34 y.o. male   Chief Complaint   Patient presents with    Vomiting    Diarrhea    Pt states has vomiting and diarrhea for the past 2 days. Had some soup today, chicken noodle. States the vomiting has gone and now just has diarrhea. States got sick at work Tuesday so left early, and took off today. Drinking fluids. he is a 34y.o. year old male who presents for evalution. Reviewed PmHx, RxHx, FmHx, SocHx, AllgHx and updated and dated in the chart. Review of Systems - negative except as listed above in the HPI    Objective:     Vitals:    02/19/20 1450   BP: 124/80   Pulse: 92   Resp: 18   Temp: 98.2 °F (36.8 °C)   TempSrc: Oral   SpO2: 96%   Weight: 214 lb (97.1 kg)   Height: 6' (1.829 m)       Current Outpatient Medications   Medication Sig    ondansetron (ZOFRAN ODT) 4 mg disintegrating tablet Take 1 Tab by mouth every eight (8) hours as needed for Nausea or Vomiting.  levocetirizine (XYZAL) 5 mg tablet Take 1 Tab by mouth daily.  dicyclomine (BENTYL) 20 mg tablet Take 1 Tab by mouth every six (6) hours as needed (abdominal cramps) for up to 20 doses. (Patient not taking: Reported on 10/29/2018)    mometasone (ELOCON) 0.1 % topical cream Apply  to affected area two (2) times a day. As needed for skin irritation/rash (Patient not taking: Reported on 10/29/2018)    bacitracin (FIRST AID BACITRACIN) 500 unit/g Oint Apply 1 Each to affected area two (2) times a day. (Patient not taking: Reported on 10/29/2018)     No current facility-administered medications for this visit. Physical Examination: General appearance - alert, well appearing, and in no distress  Chest - clear to auscultation, no wheezes, rales or rhonchi, symmetric air entry  Heart - normal rate, regular rhythm, normal S1, S2, no murmurs, rubs, clicks or gallops  Abdomen - soft, nontender, nondistended, no masses or organomegaly      Assessment/ Plan:   Diagnoses and all orders for this visit:    1. Viral gastroenteritis  -     ondansetron (ZOFRAN ODT) 4 mg disintegrating tablet; Take 1 Tab by mouth every eight (8) hours as needed for Nausea or Vomiting. Pt feels he will be able to RTW tomorrow. DIscussed if he wakes up feeling unwell again and needs to take the day off, markie that is fine and I will complete ppwk for him. Follow-up and Dispositions    · Return if symptoms worsen or fail to improve. I have discussed the diagnosis with the patient and the intended plan as seen in the above orders. The patient has received an after-visit summary and questions were answered concerning future plans. Pt conveyed understanding of plan.     Medication Side Effects and Warnings were discussed with patient      Kaushal Mcgill, DO

## 2020-02-19 NOTE — PROGRESS NOTES
Chief Complaint   Patient presents with    Vomiting    Diarrhea     Patient presents in office today with c/o vomiting and diarrhea for the last 2 days. No other concerns.

## 2021-03-22 ENCOUNTER — OFFICE VISIT (OUTPATIENT)
Dept: FAMILY MEDICINE CLINIC | Age: 31
End: 2021-03-22

## 2021-03-22 VITALS
OXYGEN SATURATION: 97 % | DIASTOLIC BLOOD PRESSURE: 82 MMHG | TEMPERATURE: 99.3 F | RESPIRATION RATE: 18 BRPM | HEART RATE: 81 BPM | SYSTOLIC BLOOD PRESSURE: 127 MMHG | BODY MASS INDEX: 32.43 KG/M2 | HEIGHT: 72 IN | WEIGHT: 239.4 LBS

## 2021-03-22 DIAGNOSIS — N28.1 CYST OF RIGHT KIDNEY: ICD-10-CM

## 2021-03-22 DIAGNOSIS — G89.29 CHRONIC PAIN OF LEFT KNEE: Primary | ICD-10-CM

## 2021-03-22 DIAGNOSIS — M25.562 CHRONIC PAIN OF LEFT KNEE: Primary | ICD-10-CM

## 2021-03-22 PROCEDURE — 99214 OFFICE O/P EST MOD 30 MIN: CPT | Performed by: NURSE PRACTITIONER

## 2021-03-22 NOTE — PROGRESS NOTES
HISTORY OF PRESENT ILLNESS  Mary Rausch is a 31 y.o. male. HPI  Patient is present in office today for L Knee pain  Pt states no injury. Patient states he believes it is related to the work he does. Ongoing for about 3months  Using advil for pain    He had an US of the kidneys 2 years ago, never did follow up for it  Showed renal cysts and enlargement of the right kidney  Never saw uro    ROS  A comprehensive review of system was obtained and negative except findings in the HPI    Visit Vitals  /82 (BP 1 Location: Left arm, BP Patient Position: Sitting, BP Cuff Size: Adult)   Pulse 81   Temp 99.3 °F (37.4 °C) (Temporal)   Resp 18   Ht 6' (1.829 m)   Wt 239 lb 6.4 oz (108.6 kg)   SpO2 97%   BMI 32.47 kg/m²     Physical Exam  Vitals signs and nursing note reviewed. Constitutional:       Appearance: Normal appearance. Cardiovascular:      Rate and Rhythm: Normal rate. Heart sounds: Normal heart sounds. Musculoskeletal:         General: Swelling present. Comments: Left knee with minimal swelling, neg Drawer sign and patella intact   Neurological:      Mental Status: He is alert. ASSESSMENT and PLAN  Encounter Diagnoses   Name Primary?  Chronic pain of left knee Yes    Cyst of right kidney      Orders Placed This Encounter    XR KNEE LT MAX 2 VWS    US RETROPERITONEUM COMP     US renal reordered to do another eval to send to uro  Left knee xray order given to do if persists  Ice and rest, consider knee sleeve at work    I have discussed the diagnosis with the patient and the intended plan as seen in the above orders. The patient has received an after-visit summary and questions were answered concerning future plans. Patient conveyed understanding of the plan at the time of the visit.     Kulwant Ladd, MSN, ANP  3/22/2021

## 2021-12-29 ENCOUNTER — HOSPITAL ENCOUNTER (EMERGENCY)
Age: 31
Discharge: HOME | End: 2021-12-29
Attending: EMERGENCY MEDICINE
Payer: COMMERCIAL

## 2021-12-29 VITALS
DIASTOLIC BLOOD PRESSURE: 74 MMHG | TEMPERATURE: 99.7 F | WEIGHT: 232 LBS | HEART RATE: 114 BPM | SYSTOLIC BLOOD PRESSURE: 121 MMHG | HEIGHT: 72 IN | BODY MASS INDEX: 31.42 KG/M2 | OXYGEN SATURATION: 98 % | RESPIRATION RATE: 20 BRPM

## 2021-12-29 DIAGNOSIS — R19.7 DIARRHEA, UNSPECIFIED TYPE: Primary | ICD-10-CM

## 2021-12-29 LAB
FLUAV AG NPH QL IA: NEGATIVE
FLUBV AG NOSE QL IA: NEGATIVE
SARS-COV-2, COV2: NORMAL

## 2021-12-29 PROCEDURE — 99283 EMERGENCY DEPT VISIT LOW MDM: CPT

## 2021-12-29 PROCEDURE — U0005 INFEC AGEN DETEC AMPLI PROBE: HCPCS

## 2021-12-29 PROCEDURE — 87804 INFLUENZA ASSAY W/OPTIC: CPT

## 2021-12-29 PROCEDURE — 74011250637 HC RX REV CODE- 250/637: Performed by: EMERGENCY MEDICINE

## 2021-12-29 RX ORDER — IBUPROFEN 800 MG/1
800 TABLET ORAL
Status: DISCONTINUED | OUTPATIENT
Start: 2021-12-29 | End: 2021-12-29

## 2021-12-29 RX ORDER — IBUPROFEN 800 MG/1
800 TABLET ORAL ONCE
Status: COMPLETED | OUTPATIENT
Start: 2021-12-29 | End: 2021-12-29

## 2021-12-29 RX ADMIN — IBUPROFEN 800 MG: 800 TABLET, FILM COATED ORAL at 02:00

## 2021-12-29 NOTE — ED TRIAGE NOTES
Pt her for fever, body aches and cough  Also endorses diarrhea and states that he has been about \"ten times today\"  Also c/o N/V  Rates body aches 8/10  Has not taken any OTC meds at home.   Pt is FÁTIMA swartz's [facility]  Appears ill

## 2021-12-29 NOTE — Clinical Note
[facility] EMERGENCY DEPARTMENT  [address] [address]  [phone]    Work/School Note    Date: 12/29/2021     To Whom It May concern:    Mago Reyes was evaluated by the following provider(s):  Attending Provider: Cristel Cough virus is suspected. Per the CDC guidelines we recommend home isolation until the following conditions are all met:    1. At least 10 days have passed since symptoms first appeared and  2. At least 24 hours have passed since last fever without the use of fever-reducing medications and  3.  Symptoms (e.g., cough, shortness of breath) have improved      Sincerely,          Mac Murphy RN

## 2021-12-29 NOTE — Clinical Note
[address] [facility] EMERGENCY DEPARTMENT  [address] [address]  [phone]    Work/School Note    Date: 12/29/2021     To Whom It May concern:    Isabel Briscoe was evaluated by the following provider(s):  Attending Provider: Ivan Hutchinson virus is suspected. Per the CDC guidelines we recommend home isolation until the following conditions are all met:    1. At least 10 days have passed since symptoms first appeared and  2. At least 24 hours have passed since last fever without the use of fever-reducing medications and  3.  Symptoms (e.g., cough, shortness of breath) have improved      Sincerely,          Maribel Hutson,

## 2021-12-30 LAB
SARS-COV-2, XPLCVT: DETECTED
SOURCE, COVRS: ABNORMAL

## 2021-12-30 NOTE — ED PROVIDER NOTES
EMERGENCY DEPARTMENT HISTORY AND PHYSICAL EXAM      Date: 12/29/2021  Patient Name: Wayne Kenney    History of Presenting Illness     Chief Complaint   Patient presents with    Cold Symptoms    Diarrhea       History Provided By: Patient    HPI: Wayne Kenney, 36 y.o. male with a past medical history significant No significant past medical history presents to the ED with cc of fever, cough, myalgias, diarrhea for the last 2 days. Patient with wife at home with similar symptoms. He is not taking any medication at home for his symptoms. He is not vaccinated for Covid or influenza. No provocative or palliative factors noted. Patient reports no abdominal pain or blood in his diarrhea. There are no other complaints, changes, or physical findings at this time. PCP: Jaspal Armstrong NP    No current facility-administered medications on file prior to encounter. Current Outpatient Medications on File Prior to Encounter   Medication Sig Dispense Refill    ondansetron (ZOFRAN ODT) 4 mg disintegrating tablet Take 1 Tab by mouth every eight (8) hours as needed for Nausea or Vomiting. 20 Tab 0    levocetirizine (XYZAL) 5 mg tablet Take 1 Tab by mouth daily. 30 Tab 5    dicyclomine (BENTYL) 20 mg tablet Take 1 Tab by mouth every six (6) hours as needed (abdominal cramps) for up to 20 doses. (Patient not taking: Reported on 10/29/2018) 20 Tab 0    mometasone (ELOCON) 0.1 % topical cream Apply  to affected area two (2) times a day. As needed for skin irritation/rash (Patient not taking: Reported on 10/29/2018) 30 g 2    bacitracin (FIRST AID BACITRACIN) 500 unit/g Oint Apply 1 Each to affected area two (2) times a day.  (Patient not taking: Reported on 10/29/2018) 1 Tube 1       Past History     Past Medical History:  Past Medical History:   Diagnosis Date    Sleep apnea        Past Surgical History:  Past Surgical History:   Procedure Laterality Date    HX HEENT  2010    throat surgery       Family History:  Family History   Problem Relation Age of Onset    No Known Problems Mother     No Known Problems Father        Social History:  Social History     Tobacco Use    Smoking status: Never Smoker    Smokeless tobacco: Never Used   Vaping Use    Vaping Use: Never used   Substance Use Topics    Alcohol use: No     Alcohol/week: 0.0 standard drinks    Drug use: No       Allergies: Allergies   Allergen Reactions    Amoxicillin Rash    Pcn [Penicillins] Hives         Review of Systems     Review of Systems   Constitutional: Positive for fever. Negative for chills. HENT: Negative for congestion and rhinorrhea. Eyes: Negative for photophobia and visual disturbance. Respiratory: Positive for cough. Negative for shortness of breath. Cardiovascular: Negative for chest pain and palpitations. Gastrointestinal: Positive for diarrhea. Negative for abdominal pain, nausea and vomiting. Endocrine: Negative for cold intolerance and heat intolerance. Genitourinary: Negative for difficulty urinating and dysuria. Musculoskeletal: Positive for myalgias. Negative for arthralgias. Skin: Negative for color change and rash. Allergic/Immunologic: Negative for environmental allergies and food allergies. Neurological: Negative for weakness and headaches. Hematological: Negative for adenopathy. Does not bruise/bleed easily. Psychiatric/Behavioral: Negative for agitation and behavioral problems. Physical Exam     Physical Exam  Constitutional:       General: He is not in acute distress. Appearance: Normal appearance. He is not ill-appearing. HENT:      Head: Normocephalic and atraumatic. Right Ear: External ear normal.      Left Ear: External ear normal.      Nose: Nose normal.      Mouth/Throat:      Mouth: Mucous membranes are moist.   Eyes:      Extraocular Movements: Extraocular movements intact.       Conjunctiva/sclera: Conjunctivae normal.      Pupils: Pupils are equal, round, and reactive to light. Cardiovascular:      Rate and Rhythm: Regular rhythm. Tachycardia present. Pulses: Normal pulses. Pulmonary:      Effort: Pulmonary effort is normal. No respiratory distress. Breath sounds: Normal breath sounds. No stridor. No wheezing or rhonchi. Abdominal:      General: Abdomen is flat. There is no distension. Musculoskeletal:         General: Normal range of motion. Cervical back: Normal range of motion. Skin:     General: Skin is warm and dry. Neurological:      General: No focal deficit present. Mental Status: He is alert and oriented to person, place, and time. Psychiatric:         Mood and Affect: Mood normal.         Behavior: Behavior normal.         Thought Content: Thought content normal.         Judgment: Judgment normal.         Lab and Diagnostic Study Results     Labs -   No results found for this or any previous visit (from the past 12 hour(s)). Radiologic Studies -   @lastxrresult@  CT Results  (Last 48 hours)    None        CXR Results  (Last 48 hours)    None            Medical Decision Making   - I am the first provider for this patient. - I reviewed the vital signs, available nursing notes, past medical history, past surgical history, family history and social history. - Initial assessment performed. The patients presenting problems have been discussed, and they are in agreement with the care plan formulated and outlined with them. I have encouraged them to ask questions as they arise throughout their visit. Vital Signs-Reviewed the patient's vital signs. No data found. Records Reviewed: Nursing Notes    The patient presents with myalgias, nausea/vomiting/diarrhea, cough with a differential diagnosis of gastroenteritis, Covid, influenza, pneumonia      ED Course:          Provider Notes (Medical Decision Making): Otherwise healthy 68-year-old male presenting for evaluation of cough, myalgias, fever, diarrhea.   Positive sick contacts at home. On physical exam, patient resting comfortably in bed. His abdomen is soft, nontender, nondistended without guarding, rebound, rigidity    Influenza negative. Covid swab obtained. Do not feel that additional laboratory or imaging evaluation is warranted at this time given relatively benign physical exam.  Patient instructed to quarantine until the results of their Covid test return and return to the ED if they develops any new or worsening symptoms. MDM       Procedures   Medical Decision Makingedical Decision Making  Performed by: Morgan Peabody, DO  PROCEDURES:  Procedures       Disposition   Disposition: Condition stable  DC- Adult Discharges: All of the diagnostic tests were reviewed and questions answered. Diagnosis, care plan and treatment options were discussed. The patient understands the instructions and will follow up as directed. The patients results have been reviewed with them. They have been counseled regarding their diagnosis. The patient verbally convey understanding and agreement of the signs, symptoms, diagnosis, treatment and prognosis and additionally agrees to follow up as recommended with their PCP in 24 - 48 hours. They also agree with the care-plan and convey that all of their questions have been answered. I have also put together some discharge instructions for them that include: 1) educational information regarding their diagnosis, 2) how to care for their diagnosis at home, as well a 3) list of reasons why they would want to return to the ED prior to their follow-up appointment, should their condition change. DC-The patient was given verbal diarrhea, Covid isolation instructions    Discharged    DISCHARGE PLAN:  1. Cannot display discharge medications since this patient is not currently admitted.     2.   Follow-up Information     Follow up With Specialties Details Why Contact Info    [address] [place] [address] Emergency Medicine  As needed, If symptoms worsen [facility]  [phone]        3. Return to ED if worse   4. Discharge Medication List as of 12/29/2021  4:14 AM            Diagnosis     Clinical Impression:   1. Diarrhea, unspecified type        Attestations:    Katia Jones, DO    Please note that this dictation was completed with [employer], the computer voice recognition software. Quite often unanticipated grammatical, syntax, homophones, and other interpretive errors are inadvertently transcribed by the computer software. Please disregard these errors. Please excuse any errors that have escaped final proofreading. Thank you.

## 2022-04-23 ENCOUNTER — HOSPITAL ENCOUNTER (EMERGENCY)
Age: 32
Discharge: HOME | End: 2022-04-23
Attending: EMERGENCY MEDICINE
Payer: COMMERCIAL

## 2022-04-23 VITALS
RESPIRATION RATE: 17 BRPM | OXYGEN SATURATION: 100 % | HEIGHT: 71 IN | TEMPERATURE: 99 F | DIASTOLIC BLOOD PRESSURE: 92 MMHG | BODY MASS INDEX: 31.64 KG/M2 | WEIGHT: 226 LBS | HEART RATE: 87 BPM | SYSTOLIC BLOOD PRESSURE: 148 MMHG

## 2022-04-23 DIAGNOSIS — K08.89 PAIN, DENTAL: Primary | ICD-10-CM

## 2022-04-23 PROCEDURE — 99283 EMERGENCY DEPT VISIT LOW MDM: CPT

## 2022-04-23 PROCEDURE — 74011250637 HC RX REV CODE- 250/637: Performed by: EMERGENCY MEDICINE

## 2022-04-23 PROCEDURE — 74011000250 HC RX REV CODE- 250: Performed by: EMERGENCY MEDICINE

## 2022-04-23 RX ORDER — IBUPROFEN 800 MG/1
800 TABLET ORAL
Qty: 20 TABLET | Refills: 0 | Status: SHIPPED | OUTPATIENT
Start: 2022-04-23 | End: 2022-04-30

## 2022-04-23 RX ORDER — CLINDAMYCIN HYDROCHLORIDE 150 MG/1
300 CAPSULE ORAL ONCE
Status: COMPLETED | OUTPATIENT
Start: 2022-04-23 | End: 2022-04-23

## 2022-04-23 RX ORDER — IBUPROFEN 800 MG/1
800 TABLET ORAL ONCE
Status: COMPLETED | OUTPATIENT
Start: 2022-04-23 | End: 2022-04-23

## 2022-04-23 RX ORDER — LIDOCAINE HYDROCHLORIDE 20 MG/ML
15 SOLUTION OROPHARYNGEAL ONCE
Status: COMPLETED | OUTPATIENT
Start: 2022-04-23 | End: 2022-04-23

## 2022-04-23 RX ORDER — DIPHENHYDRAMINE HCL 25 MG
50 CAPSULE ORAL ONCE
Status: COMPLETED | OUTPATIENT
Start: 2022-04-23 | End: 2022-04-23

## 2022-04-23 RX ORDER — CLINDAMYCIN HYDROCHLORIDE 300 MG/1
300 CAPSULE ORAL 4 TIMES DAILY
Qty: 28 CAPSULE | Refills: 0 | Status: SHIPPED | OUTPATIENT
Start: 2022-04-23 | End: 2022-04-30

## 2022-04-23 RX ORDER — ACETAMINOPHEN 500 MG
1000 TABLET ORAL ONCE
Status: COMPLETED | OUTPATIENT
Start: 2022-04-23 | End: 2022-04-23

## 2022-04-23 RX ADMIN — CLINDAMYCIN HYDROCHLORIDE 300 MG: 150 CAPSULE ORAL at 01:37

## 2022-04-23 RX ADMIN — LIDOCAINE HYDROCHLORIDE 15 ML: 20 SOLUTION ORAL; TOPICAL at 01:37

## 2022-04-23 RX ADMIN — DIPHENHYDRAMINE HYDROCHLORIDE 50 MG: 25 CAPSULE ORAL at 01:37

## 2022-04-23 RX ADMIN — IBUPROFEN 800 MG: 800 TABLET, FILM COATED ORAL at 01:37

## 2022-04-23 RX ADMIN — ACETAMINOPHEN 1000 MG: 500 TABLET ORAL at 01:37

## 2022-04-23 NOTE — Clinical Note
[address] [facility] EMERGENCY DEPARTMENT  [address] [facility] [place] [address]  [phone]    Work/School Note    Date: 4/23/2022    To Whom It May concern:    Anola Castleman was seen and treated today in the emergency room by the following provider(s):  Attending Provider: Fanny Fortune MD.      Anola Castleman is excused from work/school on 04/23/22 and 04/24/22. He is medically clear to return to work/school on 4/25/2022.        Sincerely,          Camacho Dixon MD

## 2022-04-23 NOTE — ED PROVIDER NOTES
EMERGENCY DEPARTMENT HISTORY AND PHYSICAL EXAM      Date: 4/23/2022  Patient Name: Cat Vitale    History of Presenting Illness     Chief Complaint   Patient presents with    Dental Pain     left lower molar       History Provided By: Patient    HPI: Cat Vitale, 36 y.o. male   presents to the ED with cc of dental pain. Patient complains of dental pain for last 2 days. Pain has been constant and moderate to severe degree without obvious aggravating alleviating factors. Patient has taken over-the-counter Aleve without much relief. No swelling of the face. No difficulty swallowing. No fever chills. No injury      PCP: Darrick Sierra NP    No current facility-administered medications on file prior to encounter. Current Outpatient Medications on File Prior to Encounter   Medication Sig Dispense Refill    ondansetron (ZOFRAN ODT) 4 mg disintegrating tablet Take 1 Tab by mouth every eight (8) hours as needed for Nausea or Vomiting. 20 Tab 0    levocetirizine (XYZAL) 5 mg tablet Take 1 Tab by mouth daily. 30 Tab 5    dicyclomine (BENTYL) 20 mg tablet Take 1 Tab by mouth every six (6) hours as needed (abdominal cramps) for up to 20 doses. (Patient not taking: Reported on 10/29/2018) 20 Tab 0    mometasone (ELOCON) 0.1 % topical cream Apply  to affected area two (2) times a day. As needed for skin irritation/rash (Patient not taking: Reported on 10/29/2018) 30 g 2    bacitracin (FIRST AID BACITRACIN) 500 unit/g Oint Apply 1 Each to affected area two (2) times a day.  (Patient not taking: Reported on 10/29/2018) 1 Tube 1       Past History     Past Medical History:  Past Medical History:   Diagnosis Date    Sleep apnea        Past Surgical History:  Past Surgical History:   Procedure Laterality Date    HX HEENT  2010    throat surgery       Family History:  Family History   Problem Relation Age of Onset    No Known Problems Mother     No Known Problems Father        Social History:  Social History     Tobacco Use    Smoking status: Never Smoker    Smokeless tobacco: Never Used   Vaping Use    Vaping Use: Never used   Substance Use Topics    Alcohol use: No     Alcohol/week: 0.0 standard drinks    Drug use: No       Allergies: Allergies   Allergen Reactions    Amoxicillin Rash    Pcn [Penicillins] Hives         Review of Systems   Review of Systems   Constitutional: Negative for chills and fever. Respiratory: Negative for cough. Neurological: Negative for headaches. Physical Exam   Physical Exam  Vitals and nursing note reviewed. Constitutional:       General: He is not in acute distress. Appearance: Normal appearance. He is not ill-appearing, toxic-appearing or diaphoretic. HENT:      Head: Normocephalic and atraumatic. Mouth/Throat:      Mouth: Mucous membranes are moist.      Comments: Left lower molar with dental decay with mild gum swelling  Eyes:      Conjunctiva/sclera: Conjunctivae normal.   Pulmonary:      Effort: Pulmonary effort is normal.   Musculoskeletal:      Cervical back: Neck supple. Skin:     General: Skin is warm and dry. Neurological:      General: No focal deficit present. Mental Status: He is alert. Psychiatric:         Behavior: Behavior normal.         Diagnostic Study Results     Labs -   No results found for this or any previous visit (from the past 12 hour(s)). Radiologic Studies -   No orders to display     CT Results  (Last 48 hours)    None        CXR Results  (Last 48 hours)    None            Medical Decision Making   I am the first provider for this patient. I reviewed the vital signs, available nursing notes, past medical history, past surgical history, family history and social history. Vital Signs-Reviewed the patient's vital signs.   Patient Vitals for the past 12 hrs:   Temp Pulse Resp BP SpO2   04/23/22 0117 99 °F (37.2 °C) 87 17 (!) 148/92 100 %       Records Reviewed:     Provider Notes (Medical Decision Making):       ED Course:   Initial assessment performed. The patients presenting problems have been discussed, and they are in agreement with the care plan formulated and outlined with them. I have encouraged them to ask questions as they arise throughout their visit. PROCEDURES      Disposition: Condition stable   DC- Adult Discharges: All of the diagnostic tests were reviewed and questions answered. Diagnosis, care plan and treatment options were discussed. understand instructions and will follow up as directed. The patients results have been reviewed with them. They have been counseled regarding their diagnosis. The patient verbally convey understanding and agreement of the signs, symptoms, diagnosis, treatment and prognosis and additionally agrees to follow up as recommended. They also agree with the care-plan and convey that all of their questions have been answered. I have also put together some discharge instructions for them that include: 1) educational information regarding their diagnosis, 2) how to care for their diagnosis at home, as well a 3) list of reasons why they would want to return to the ED prior to their follow-up appointment, should their condition change. PLAN:  1. Discharge Medication List as of 4/23/2022  1:33 AM      START taking these medications    Details   clindamycin (CLEOCIN) 300 mg capsule Take 1 Capsule by mouth four (4) times daily for 7 days. , Normal, Disp-28 Capsule, R-0      ibuprofen (MOTRIN) 800 mg tablet Take 1 Tablet by mouth every eight (8) hours as needed for Pain for up to 7 days. , Normal, Disp-20 Tablet, R-0         CONTINUE these medications which have NOT CHANGED    Details   ondansetron (ZOFRAN ODT) 4 mg disintegrating tablet Take 1 Tab by mouth every eight (8) hours as needed for Nausea or Vomiting., Normal, Disp-20 Tab, R-0      levocetirizine (XYZAL) 5 mg tablet Take 1 Tab by mouth daily. , Normal, Disp-30 Tab, R-5      dicyclomine (BENTYL) 20 mg tablet Take 1 Tab by mouth every six (6) hours as needed (abdominal cramps) for up to 20 doses. , Print, Disp-20 Tab, R-0      mometasone (ELOCON) 0.1 % topical cream Apply  to affected area two (2) times a day. As needed for skin irritation/rash, Normal, Disp-30 g, R-2      bacitracin (FIRST AID BACITRACIN) 500 unit/g Oint Apply 1 Each to affected area two (2) times a day., Normal, Disp-1 Tube, R-1           2. Follow-up Information     Follow up With Specialties Details Why Contact Info    Follow up with your primary care physician  Schedule an appointment as soon as possible for a visit in 3 days As needed     Follow-up with a dentist of your choice  Schedule an appointment as soon as possible for a visit           Return to ED if worse     Diagnosis     Clinical Impression:   1. Pain, dental        Please note that this dictation was completed with [employer], the computer voice recognition software. Quite often unanticipated grammatical, syntax, homophones, and other interpretive errors are inadvertently transcribed by the computer software. Please disregard these errors. Please excuse any errors that have escaped final proofreading. Thank you.

## 2022-04-23 NOTE — ED TRIAGE NOTES
Pt cracked left lower molar a few days ago. Pt states pain is getting worse.   Pt has an appt for May 9th for the dentist.

## 2022-11-23 ENCOUNTER — NURSE TRIAGE (OUTPATIENT)
Dept: OTHER | Facility: CLINIC | Age: 32
End: 2022-11-23

## 2022-11-23 NOTE — TELEPHONE ENCOUNTER
Location of patient: [address]  call from Marium Caro at [facility] with [employer]. Subjective: Caller states \"I am calling for my son - I am going to get him on face time - can you hold? \"     Caller placed writer on hold and never came back to the line. Placed two outgoing calls without success. Left generic message on voice mail. Attention Provider: Thank you for allowing me to participate in the care of your patient. The patient was connected to triage in response to information provided to the [facility]. Please do not respond through this encounter as the response is not directed to a shared pool.     Reason for Disposition   Message left on identified voicemail    Protocols used: No Contact or Duplicate Contact Call-ADULT-[place]

## 2023-01-18 ENCOUNTER — TELEPHONE (OUTPATIENT)
Dept: FAMILY MEDICINE CLINIC | Age: 33
End: 2023-01-18

## 2023-01-18 ENCOUNTER — VIRTUAL VISIT (OUTPATIENT)
Dept: FAMILY MEDICINE CLINIC | Age: 33
End: 2023-01-18
Payer: COMMERCIAL

## 2023-01-18 DIAGNOSIS — U07.1 COVID-19: Primary | ICD-10-CM

## 2023-01-18 PROCEDURE — 99213 OFFICE O/P EST LOW 20 MIN: CPT | Performed by: NURSE PRACTITIONER

## 2023-01-18 NOTE — LETTER
1/18/2023 3:45 PM    Mr. Chin Flores  [phone]      Please excuse patient from work November 29 - December 2, 2022 due to COVID positive illness. This letter will be a substitution for any required FMLA paperwork due to the short nature of the time missed from work. This practice is not completing FMLA paperwork for COVID unless there is a long duration of time missed or a hospitalization.     Case #[id number]        Sincerely,      Madhuri Hurd NP

## 2023-01-18 NOTE — PROGRESS NOTES
Lupe Saha (: 1990) is a 29 y.o. male, established patient, here for evaluation of the following chief complaint(s):   Documentation       ASSESSMENT/PLAN:  Below is the assessment and plan developed based on review of pertinent history, labs, studies, and medications. Diagnoses and all orders for this visit:    1.  COVID-19    Letter done for work for the time missed due to covid  Follow up prn      SUBJECTIVE/OBJECTIVE:  HPI    Pt being seen to discuss FMLA  -pt states he is taking a leave of absence because of covid  -pt states he was out of work 4 days   -pt states that he took an at home test    Review of Systems   A comprehensive review of system was obtained and negative except findings in the HPI    No data recorded     Physical Exam    [INSTRUCTIONS:  \"[x]\" Indicates a positive item  \"[]\" Indicates a negative item  -- DELETE ALL ITEMS NOT EXAMINED]    Constitutional: [x] Appears well-developed and well-nourished [x] No apparent distress      [] Abnormal -     Mental status: [x] Alert and awake  [x] Oriented to person/place/time [x] Able to follow commands    [] Abnormal -     Eyes:   EOM    [x]  Normal    [] Abnormal -   Sclera  [x]  Normal    [] Abnormal -          Discharge [x]  None visible   [] Abnormal -     HENT: [x] Normocephalic, atraumatic  [] Abnormal -   [x] Mouth/Throat: Mucous membranes are moist    External Ears [x] Normal  [] Abnormal -    Neck: [x] No visualized mass [] Abnormal -     Pulmonary/Chest: [x] Respiratory effort normal   [x] No visualized signs of difficulty breathing or respiratory distress        [] Abnormal -      Musculoskeletal:   [x] Normal gait with no signs of ataxia         [x] Normal range of motion of neck        [] Abnormal -     Neurological:        [x] No Facial Asymmetry (Cranial nerve 7 motor function) (limited exam due to video visit)          [x] No gaze palsy        [] Abnormal -          Skin:        [x] No significant exanthematous lesions or discoloration noted on facial skin         [] Abnormal -            Psychiatric:       [x] Normal Affect [] Abnormal -        [x] No Hallucinations    Other pertinent observable physical exam findings:-    On this date 01/18/2023 I have spent 15 minutes reviewing previous notes, test results and face to face (virtual) with the patient discussing the diagnosis and importance of compliance with the treatment plan as well as documenting on the day of the visit. [place], was evaluated through a synchronous (real-time) audio-video encounter. The patient (or guardian if applicable) is aware that this is a billable service, which includes applicable co-pays. This Virtual Visit was conducted with patient's (and/or legal guardian's) consent. The visit was conducted pursuant to the emergency declaration under the [address] [place] [address], [address] [place] [address] authority and the [employer] and [employer] General Act. Patient identification was verified, and a caregiver was present when appropriate. The patient was located at: Home: [place]  [phone]  The provider was located at: Home: [address] [facility] was used to authenticate this note.   -- Ramakrishna Zazueta NP

## 2023-01-18 NOTE — PROGRESS NOTES
Chief Complaint   Patient presents with    Documentation     Pt being seen to discuss FMLA  -pt states he is taking a leave of absence because of covid  -pt states he was out of work 4 days   -pt states that he took an at home test    1. Have you been to the ER, urgent care clinic since your last visit? Hospitalized since your last visit? No    2. Have you seen or consulted any other health care providers outside of the [address] [place] [address] since your last visit? Include any pap smears or colon screening.  No    Pt has no other concerns

## 2024-06-11 ENCOUNTER — TELEPHONE (OUTPATIENT)
Age: 34
End: 2024-06-11

## 2024-06-11 NOTE — TELEPHONE ENCOUNTER
----- Message from Fabby Gigi sent at 6/11/2024  2:57 PM EDT -----  Subject: Appointment Request    Reason for Call: Established Patient Appointment needed: Routine (Patient   Request) No Script    QUESTIONS    Reason for appointment request? Available appointments did not meet   patient need     Additional Information for Provider? Patient is wanting to be seen for   right side pain that radiates down to leg when coughing. Cough has been   lingering for months. Please give a call back to schedule sooner than   August appointment - concerned with the recent scans showing enlarged   kidneys. Patient's mother also claims there might be a need from PCP to   get another scan again so please advise if needing for neurologist.   ---------------------------------------------------------------------------  --------------  CALL BACK INFO  [phone]; OK to leave message on voicemail,OK to respond with electronic   message via [employer] portal (only for patients who have registered [employer]   account)  ---------------------------------------------------------------------------  --------------  SCRIPT ANSWERS

## 2024-08-19 ENCOUNTER — CLINICAL DOCUMENTATION (OUTPATIENT)
Age: 34
End: 2024-08-19

## 2024-08-19 NOTE — PROGRESS NOTES
E[facility] transferred patient for triage. Patient stated he has some facial swelling from a tooth infection. Unable to get to the office in the slots  available. Stated he will go to urgent care, his dentist didn't have any appointments soon to see him.